# Patient Record
Sex: FEMALE | Race: BLACK OR AFRICAN AMERICAN | Employment: OTHER | URBAN - METROPOLITAN AREA
[De-identification: names, ages, dates, MRNs, and addresses within clinical notes are randomized per-mention and may not be internally consistent; named-entity substitution may affect disease eponyms.]

---

## 2017-01-01 ENCOUNTER — APPOINTMENT (OUTPATIENT)
Dept: INTERVENTIONAL RADIOLOGY/VASCULAR | Facility: HOSPITAL | Age: 72
DRG: 871 | End: 2017-01-01
Attending: INTERNAL MEDICINE
Payer: MEDICARE

## 2017-01-01 ENCOUNTER — APPOINTMENT (OUTPATIENT)
Dept: CT IMAGING | Facility: HOSPITAL | Age: 72
DRG: 871 | End: 2017-01-01
Attending: EMERGENCY MEDICINE
Payer: MEDICARE

## 2017-01-01 ENCOUNTER — HOSPITAL ENCOUNTER (INPATIENT)
Facility: HOSPITAL | Age: 72
LOS: 1 days | DRG: 871 | End: 2017-01-01
Attending: EMERGENCY MEDICINE | Admitting: HOSPITALIST
Payer: MEDICARE

## 2017-01-01 ENCOUNTER — APPOINTMENT (OUTPATIENT)
Dept: GENERAL RADIOLOGY | Facility: HOSPITAL | Age: 72
DRG: 871 | End: 2017-01-01
Attending: EMERGENCY MEDICINE
Payer: MEDICARE

## 2017-01-01 ENCOUNTER — LAB ENCOUNTER (OUTPATIENT)
Dept: LAB | Age: 72
End: 2017-01-01
Attending: INTERNAL MEDICINE
Payer: MEDICARE

## 2017-01-01 VITALS
TEMPERATURE: 98 F | WEIGHT: 158 LBS | DIASTOLIC BLOOD PRESSURE: 53 MMHG | HEIGHT: 65 IN | HEART RATE: 67 BPM | RESPIRATION RATE: 21 BRPM | SYSTOLIC BLOOD PRESSURE: 73 MMHG | BODY MASS INDEX: 26.33 KG/M2 | OXYGEN SATURATION: 96 %

## 2017-01-01 DIAGNOSIS — I21.4 ACUTE NON-ST-ELEVATION MYOCARDIAL INFARCTION (HCC): ICD-10-CM

## 2017-01-01 DIAGNOSIS — R57.9 SHOCK (HCC): Primary | ICD-10-CM

## 2017-01-01 DIAGNOSIS — R19.7 NAUSEA VOMITING AND DIARRHEA: ICD-10-CM

## 2017-01-01 DIAGNOSIS — E11.9 DIABETES MELLITUS TYPE 2 WITHOUT RETINOPATHY (HCC): ICD-10-CM

## 2017-01-01 DIAGNOSIS — H35.033 HYPERTENSIVE RETINOPATHY OF BOTH EYES, GRADE 2: Chronic | ICD-10-CM

## 2017-01-01 DIAGNOSIS — N18.6 ESRD (END STAGE RENAL DISEASE) (HCC): Chronic | ICD-10-CM

## 2017-01-01 DIAGNOSIS — I25.10 CORONARY ARTERY DISEASE INVOLVING NATIVE CORONARY ARTERY OF NATIVE HEART WITHOUT ANGINA PECTORIS: ICD-10-CM

## 2017-01-01 DIAGNOSIS — Z79.899 ENCOUNTER FOR LONG-TERM (CURRENT) DRUG USE: ICD-10-CM

## 2017-01-01 DIAGNOSIS — I35.0 NONRHEUMATIC AORTIC VALVE STENOSIS: ICD-10-CM

## 2017-01-01 DIAGNOSIS — E78.2 MIXED HYPERLIPIDEMIA: ICD-10-CM

## 2017-01-01 DIAGNOSIS — R11.2 NAUSEA VOMITING AND DIARRHEA: ICD-10-CM

## 2017-01-01 DIAGNOSIS — I10 BENIGN ESSENTIAL HTN: ICD-10-CM

## 2017-01-01 LAB
ALBUMIN SERPL-MCNC: 4.1 G/DL (ref 3.5–4.8)
ALP LIVER SERPL-CCNC: 74 U/L (ref 55–142)
ALT SERPL-CCNC: 13 U/L (ref 14–54)
AST SERPL-CCNC: 9 U/L (ref 15–41)
BASOPHILS # BLD AUTO: 0.11 X10(3) UL (ref 0–0.1)
BASOPHILS NFR BLD AUTO: 2.1 %
BILIRUB SERPL-MCNC: 0.4 MG/DL (ref 0.1–2)
BUN BLD-MCNC: 37 MG/DL (ref 8–20)
CALCIUM BLD-MCNC: 7.8 MG/DL (ref 8.3–10.3)
CHLORIDE: 99 MMOL/L (ref 101–111)
CHOLEST SMN-MCNC: 208 MG/DL (ref ?–200)
CO2: 30 MMOL/L (ref 22–32)
CREAT BLD-MCNC: 9.63 MG/DL (ref 0.55–1.02)
EOSINOPHIL # BLD AUTO: 0.21 X10(3) UL (ref 0–0.3)
EOSINOPHIL NFR BLD AUTO: 4 %
ERYTHROCYTE [DISTWIDTH] IN BLOOD BY AUTOMATED COUNT: 14.6 % (ref 11.5–16)
EST. AVERAGE GLUCOSE BLD GHB EST-MCNC: 108 MG/DL (ref 68–126)
GLUCOSE BLD-MCNC: 87 MG/DL (ref 70–99)
HBA1C MFR BLD HPLC: 5.4 % (ref ?–5.7)
HCT VFR BLD AUTO: 38.3 % (ref 34–50)
HDLC SERPL-MCNC: 48 MG/DL (ref 45–?)
HDLC SERPL: 4.33 {RATIO} (ref ?–4.44)
HGB BLD-MCNC: 12.3 G/DL (ref 12–16)
IMMATURE GRANULOCYTE COUNT: 0.01 X10(3) UL (ref 0–1)
IMMATURE GRANULOCYTE RATIO %: 0.2 %
LDLC SERPL CALC-MCNC: 131 MG/DL (ref ?–130)
LYMPHOCYTES # BLD AUTO: 1.36 X10(3) UL (ref 0.9–4)
LYMPHOCYTES NFR BLD AUTO: 25.8 %
M PROTEIN MFR SERPL ELPH: 7.9 G/DL (ref 6.1–8.3)
MCH RBC QN AUTO: 29.9 PG (ref 27–33.2)
MCHC RBC AUTO-ENTMCNC: 32.1 G/DL (ref 31–37)
MCV RBC AUTO: 93.2 FL (ref 81–100)
MONOCYTES # BLD AUTO: 0.44 X10(3) UL (ref 0.1–0.6)
MONOCYTES NFR BLD AUTO: 8.3 %
NEUTROPHIL ABS PRELIM: 3.14 X10 (3) UL (ref 1.3–6.7)
NEUTROPHILS # BLD AUTO: 3.14 X10(3) UL (ref 1.3–6.7)
NEUTROPHILS NFR BLD AUTO: 59.6 %
NONHDLC SERPL-MCNC: 160 MG/DL (ref ?–130)
PLATELET # BLD AUTO: 300 10(3)UL (ref 150–450)
POTASSIUM SERPL-SCNC: 4.1 MMOL/L (ref 3.6–5.1)
RBC # BLD AUTO: 4.11 X10(6)UL (ref 3.8–5.1)
RED CELL DISTRIBUTION WIDTH-SD: 49.9 FL (ref 35.1–46.3)
SODIUM SERPL-SCNC: 141 MMOL/L (ref 136–144)
TRIGLYCERIDES: 143 MG/DL (ref ?–150)
VLDL: 29 MG/DL (ref 5–40)
WBC # BLD AUTO: 5.3 X10(3) UL (ref 4–13)

## 2017-01-01 PROCEDURE — 83036 HEMOGLOBIN GLYCOSYLATED A1C: CPT

## 2017-01-01 PROCEDURE — 36415 COLL VENOUS BLD VENIPUNCTURE: CPT

## 2017-01-01 PROCEDURE — 71010 XR CHEST AP PORTABLE  (CPT=71010): CPT | Performed by: EMERGENCY MEDICINE

## 2017-01-01 PROCEDURE — 80061 LIPID PANEL: CPT

## 2017-01-01 PROCEDURE — 85025 COMPLETE CBC W/AUTO DIFF WBC: CPT

## 2017-01-01 PROCEDURE — 74177 CT ABD & PELVIS W/CONTRAST: CPT | Performed by: EMERGENCY MEDICINE

## 2017-01-01 PROCEDURE — 99223 1ST HOSP IP/OBS HIGH 75: CPT | Performed by: INTERNAL MEDICINE

## 2017-01-01 PROCEDURE — 80053 COMPREHEN METABOLIC PANEL: CPT

## 2017-01-01 RX ORDER — ONDANSETRON 2 MG/ML
INJECTION INTRAMUSCULAR; INTRAVENOUS
Status: DISCONTINUED
Start: 2017-01-01 | End: 2017-01-01

## 2017-01-01 RX ORDER — ONDANSETRON 2 MG/ML
4 INJECTION INTRAMUSCULAR; INTRAVENOUS ONCE
Status: COMPLETED | OUTPATIENT
Start: 2017-01-01 | End: 2017-01-01

## 2017-01-01 RX ORDER — LIDOCAINE HYDROCHLORIDE 10 MG/ML
INJECTION, SOLUTION INFILTRATION; PERINEURAL
Status: COMPLETED
Start: 2017-01-01 | End: 2017-01-01

## 2017-01-01 RX ORDER — LORAZEPAM 2 MG/ML
0.5 INJECTION INTRAMUSCULAR EVERY 4 HOURS PRN
Status: DISCONTINUED | OUTPATIENT
Start: 2017-01-01 | End: 2017-01-01

## 2017-01-01 RX ORDER — MORPHINE SULFATE 4 MG/ML
INJECTION, SOLUTION INTRAMUSCULAR; INTRAVENOUS
Status: DISCONTINUED
Start: 2017-01-01 | End: 2017-01-01

## 2017-01-01 RX ORDER — DOPAMINE HYDROCHLORIDE 320 MG/100ML
INJECTION, SOLUTION INTRAVENOUS
Status: DISCONTINUED
Start: 2017-01-01 | End: 2017-01-01

## 2017-01-01 RX ORDER — MORPHINE SULFATE 4 MG/ML
1 INJECTION, SOLUTION INTRAMUSCULAR; INTRAVENOUS
Status: DISCONTINUED | OUTPATIENT
Start: 2017-01-01 | End: 2017-01-01

## 2017-01-01 RX ORDER — DOPAMINE HYDROCHLORIDE 320 MG/100ML
INJECTION, SOLUTION INTRAVENOUS CONTINUOUS
Status: DISCONTINUED | OUTPATIENT
Start: 2017-01-01 | End: 2017-01-01

## 2017-01-01 RX ORDER — LORAZEPAM 2 MG/ML
2 INJECTION INTRAMUSCULAR EVERY 4 HOURS PRN
Status: DISCONTINUED | OUTPATIENT
Start: 2017-01-01 | End: 2017-01-01

## 2017-01-01 RX ORDER — SODIUM CHLORIDE 9 MG/ML
INJECTION, SOLUTION INTRAVENOUS ONCE
Status: COMPLETED | OUTPATIENT
Start: 2017-01-01 | End: 2017-01-01

## 2017-01-01 RX ORDER — HEPARIN SODIUM 5000 [USP'U]/ML
INJECTION, SOLUTION INTRAVENOUS; SUBCUTANEOUS
Status: COMPLETED
Start: 2017-01-01 | End: 2017-01-01

## 2017-01-01 RX ORDER — ONDANSETRON 2 MG/ML
INJECTION INTRAMUSCULAR; INTRAVENOUS
Status: COMPLETED
Start: 2017-01-01 | End: 2017-01-01

## 2017-01-01 RX ORDER — LORAZEPAM 2 MG/ML
1 INJECTION INTRAMUSCULAR EVERY 4 HOURS PRN
Status: DISCONTINUED | OUTPATIENT
Start: 2017-01-01 | End: 2017-01-01

## 2017-02-08 ENCOUNTER — HOSPITAL (OUTPATIENT)
Dept: OTHER | Age: 72
End: 2017-02-08
Attending: EMERGENCY MEDICINE

## 2017-02-08 LAB
ALBUMIN SERPL-MCNC: 3.8 GM/DL (ref 3.6–5.1)
ALBUMIN/GLOB SERPL: 1 {RATIO} (ref 1–2.4)
ALP SERPL-CCNC: 83 UNIT/L (ref 45–117)
ALT SERPL-CCNC: 11 UNIT/L
ANALYZER ANC (IANC): ABNORMAL
ANION GAP SERPL CALC-SCNC: 16 MMOL/L (ref 10–20)
AST SERPL-CCNC: 12 UNIT/L
BASOPHILS # BLD: 0 THOUSAND/MCL (ref 0–0.3)
BASOPHILS NFR BLD: 0 %
BILIRUB SERPL-MCNC: 0.5 MG/DL (ref 0.2–1)
BUN SERPL-MCNC: 38 MG/DL (ref 10–20)
BUN/CREAT SERPL: 5 (ref 7–25)
CALCIUM SERPL-MCNC: 7.6 MG/DL (ref 8.4–10.2)
CHLORIDE: 101 MMOL/L (ref 98–107)
CO2 SERPL-SCNC: 28 MMOL/L (ref 21–32)
CREAT SERPL-MCNC: 8.35 MG/DL (ref 0.51–0.95)
DIFFERENTIAL METHOD BLD: ABNORMAL
EOSINOPHIL # BLD: 0 THOUSAND/MCL (ref 0.1–0.5)
EOSINOPHIL NFR BLD: 0 %
ERYTHROCYTE [DISTWIDTH] IN BLOOD: 15.1 % (ref 11–15)
GLOBULIN SER-MCNC: 3.7 GM/DL (ref 2–4)
GLUCOSE SERPL-MCNC: 178 MG/DL (ref 65–99)
HEMATOCRIT: 46.1 % (ref 36–46.5)
HGB BLD-MCNC: 15.2 GM/DL (ref 12–15.5)
LYMPHOCYTES # BLD: 1 THOUSAND/MCL (ref 1–4)
LYMPHOCYTES NFR BLD: 9 %
MCH RBC QN AUTO: 30.4 PG (ref 26–34)
MCHC RBC AUTO-ENTMCNC: 33 GM/DL (ref 32–36.5)
MCV RBC AUTO: 92.2 FL (ref 78–100)
MONOCYTES # BLD: 0.1 THOUSAND/MCL (ref 0.3–0.9)
MONOCYTES NFR BLD: 1 %
NEUTROPHILS # BLD: 10.5 THOUSAND/MCL (ref 1.8–7.7)
NEUTROPHILS NFR BLD: 90 %
NEUTS SEG NFR BLD: ABNORMAL %
PERCENT NRBC: ABNORMAL
PLATELET # BLD: 296 THOUSAND/MCL (ref 140–450)
POTASSIUM SERPL-SCNC: 4.1 MMOL/L (ref 3.4–5.1)
PROT SERPL-MCNC: 7.5 GM/DL (ref 6.4–8.2)
RBC # BLD: 5 MILLION/MCL (ref 4–5.2)
SODIUM SERPL-SCNC: 141 MMOL/L (ref 135–145)
TROPONIN I SERPL HS-MCNC: <0.02 NG/ML
WBC # BLD: 11.6 THOUSAND/MCL (ref 4.2–11)

## 2017-02-09 ENCOUNTER — DIAGNOSTIC TRANS (OUTPATIENT)
Dept: OTHER | Age: 72
End: 2017-02-09

## 2017-05-31 PROBLEM — I10 BENIGN ESSENTIAL HTN: Status: ACTIVE | Noted: 2017-01-01

## 2017-05-31 PROBLEM — E78.2 MIXED HYPERLIPIDEMIA: Status: ACTIVE | Noted: 2017-01-01

## 2017-09-06 PROBLEM — H34.8112 CENTRAL RETINAL VEIN OCCLUSION OF RIGHT EYE: Status: ACTIVE | Noted: 2017-01-01

## 2017-09-06 PROBLEM — Z96.1 PSEUDOPHAKIA: Status: ACTIVE | Noted: 2017-01-01

## 2017-11-19 PROBLEM — R11.2 NAUSEA VOMITING AND DIARRHEA: Status: ACTIVE | Noted: 2017-01-01

## 2017-11-19 PROBLEM — R19.7 NAUSEA VOMITING AND DIARRHEA: Status: ACTIVE | Noted: 2017-01-01

## 2017-11-19 PROBLEM — I21.4 ACUTE NON-ST-ELEVATION MYOCARDIAL INFARCTION (HCC): Status: ACTIVE | Noted: 2017-01-01

## 2017-11-19 PROBLEM — R57.9 SHOCK (HCC): Status: ACTIVE | Noted: 2017-01-01

## 2017-11-19 NOTE — ED PROVIDER NOTES
Patient Seen in: [facility] Emergency Department    History   Patient presents with:  Nausea/Vomiting/Diarrhea (gastrointestinal)    Stated Complaint: n/v/d since yest - BP 90 Palp    HPI    77-year-old female here with nausea, vomiting, diarrhea fo 0911]  SpO2: 94 % [11/19/17 0922]  O2 Device: None (Room air) [11/19/17 0922]    Current:BP (!) 73/53   Pulse 67   Temp 97.8 °F (36.6 °C) (Temporal)   Resp 21   Ht 165.1 cm (5' 5\")   Wt 71.7 kg   SpO2 96%   BMI 26.29 kg/m²         Physical Exam      Const 5.7 (*)     Total Hemoglobin 9.8 (*)     Ionized Calcium 1.02 (*)     Lactic Acid Arterial >17.5 (*)     All other components within normal limits   CBC W/ DIFFERENTIAL - Abnormal; Notable for the following:     WBC 22.2 (*)     RBC 3.48 (*)     HGB 10.5 ( down.  She is given a liter of fluids but her blood pressure did not respond. We will continue to be aggressive with fluids. Given her extremely low bicarb on the blood gas, bicarb of 6, will start on a bicarb drip as well.   Will consult renal.      ID. should assess how aggressive they would like us to be. Both the patient's son and daughter agreed that they would not want her intubated. They understood that her breathing would likely worsen.     They wanted more time to discuss how aggressive they want

## 2017-11-19 NOTE — CONSULTS
[facility] Consultation NoteConrashmi Cali MD    The patient was interviewed, examined, the chart was reviewed and the consult was dictated. This is a 67year old female with a chief complaint of weakness, nausea and vomiting with lightheadedness.     Imp

## 2017-11-19 NOTE — PROGRESS NOTES
11/19/17 1409   Clinical Encounter Type   Visited With Patient and family together   Routine Visit Follow-up   Continue Visiting No   Crisis Visit Death   [facility] Encounters   [facility] Needs Prayer   Patient Spiritual Encounters   Spiritual Needs Fam

## 2017-11-19 NOTE — H&P
Pt  prior to being seen by me. Chart reviewed.  Family had transitioned pt to comfort care and per chart review,  at 0

## 2017-11-19 NOTE — CONSULTS
[facility] Pharmacy Note:  Renal Adjustment for Zosyn (piperacillin/tazobactam)    Kwesi Parry is a 67year old female who has been prescribed Zosyn (piperacillin/tazobactam) 3.375 g every 8 hrs.   CrCl is estimated creatinine clearance is 5.7 mL/min (based on

## 2017-11-19 NOTE — CONSULTS
[facility] dosing service    Initial Pharmacokinetic Consult for Vancomycin Dosing     Jules Copeland is a 67year old female who is being treated for sepsis.   Pharmacy has been asked to dose Vancomycin by Dr. Yaya Bauer    She has No Known Allerg in her care.     Cal Middleton, PharmD  11/19/2017  2:36 PM  [address] [facility] [address]: [phone]

## 2017-11-19 NOTE — CONSULTS
[facility]  Report of Consultation    Tasha Thornwood Patient Status:  Emergency    1945 MRN [id number]   Location [address] [facility] Attending Natalie Quinones MD   [facility] Day # 0 PCP Braden Powell MD       Assessment / Plan: severe metabolic acidosis, elevated troponin, etc. per daughter, she was apparently fine Friday night into yesterday. Patient denies any abdominal pain chest pain shortness of breath cough or other unusual symptoms. She is anuric.   She denies any recent X 8 MM Does not apply Misc USE TO INJECT INSULIN PER SLIDING SCALE   ALCOHOL SWABS Does not apply Pads USE WITH INSULIN AS PER SLIDING SCALE   Glucose Blood (PHARMACIST CHOICE NO CODING) In Vitro Strip Use two times daily to check blood sugar, Dx E11.9   P Vitamins-Minerals (OCUVITE-LUTEIN) Oral Cap Take one tablet at bedtime   Insulin Isophane Human (HUMULIN N,NOVOLIN N) 100 UNIT/ML Subcutaneous Suspension Use 10 units bid as directed.  (Patient taking differently: Inject 10 Units into the skin every 12 (twe allowing me to participate in the care of your patient.     Vidya Aaron  11/19/2017  11:53 AM

## 2017-11-19 NOTE — CONSULTS
Critical Care Consult     Assessment / Plan:  Patient is severely ill with septic and cardiogenic shock. Poor prognosis discussed with her children. Discussed intubation with family and they stated that is not something she would want done.  I told them if None on file     Social History Narrative   None on file       Family History   Problem Relation Age of Onset   • Diabetes Father    • Heart Disorder Father    • Lipids Father    • Hypertension Father    • Diabetes Mother    • Heart Disorder Mother    •

## 2017-11-19 NOTE — SIGNIFICANT EVENT
Pt received at 1320 from ED with a BP in the 60's and agonal breathing. Trendelenburged pt to improve pressure for a few moments. Pt's BP however decreased to the 30's. Family decided beforehand to make pt comfort care. Family brought into room.  All drips

## 2017-11-19 NOTE — PROGRESS NOTES
Patient came from ED with agonal breathing and a blood pressure of 31/20. Spoke with patient's family and updated them on patient's current status.  Decision was made by patient's daughter and patient's son to make the patient a full DNR and to keep her

## 2017-11-20 NOTE — CONSULTS
[address]    PATIENT'S NAME: Jun Coleman   ATTENDING PHYSICIAN: Natalie Rinaldi. David Moise MD   CONSULTING PHYSICIAN: Olga Bauer M.D.    PATIENT ACCOUNT#:   [de-identified]    LOCATION:  [address] [place] [address]  MEDICAL RECORD #:   [id number]       DATE OF BIRTH: amputation, left side. Patient has been sick over the last several days with weakness, loss of appetite, nausea, and vomiting. Patient today was unable to get out of bed, and she was brought to the emergency room.   Here, patient is hypotensive, acidotic,